# Patient Record
Sex: MALE | Race: WHITE
[De-identification: names, ages, dates, MRNs, and addresses within clinical notes are randomized per-mention and may not be internally consistent; named-entity substitution may affect disease eponyms.]

---

## 2023-09-08 ENCOUNTER — HOSPITAL ENCOUNTER (OUTPATIENT)
Dept: HOSPITAL 93 - PPH VACUNA | Age: 23
Discharge: HOME | End: 2023-09-08
Attending: PEDIATRICS
Payer: COMMERCIAL

## 2023-09-08 DIAGNOSIS — Z23: Primary | ICD-10-CM

## 2023-09-08 PROCEDURE — 90686 IIV4 VACC NO PRSV 0.5 ML IM: CPT

## 2023-09-23 ENCOUNTER — HOSPITAL ENCOUNTER (OUTPATIENT)
Dept: HOSPITAL 93 - RAD | Age: 23
Discharge: HOME | End: 2023-09-23
Attending: INTERNAL MEDICINE
Payer: COMMERCIAL

## 2023-09-23 DIAGNOSIS — J44.9: Primary | ICD-10-CM

## 2024-12-17 ENCOUNTER — HOSPITAL ENCOUNTER (EMERGENCY)
Dept: HOSPITAL 93 - ER | Age: 24
Discharge: HOME | End: 2024-12-17
Payer: OTHER GOVERNMENT

## 2024-12-17 VITALS — BODY MASS INDEX: 30.46 KG/M2 | HEIGHT: 68 IN | WEIGHT: 201 LBS

## 2024-12-17 DIAGNOSIS — L02.211: Primary | ICD-10-CM

## 2024-12-17 PROCEDURE — 99282 EMERGENCY DEPT VISIT SF MDM: CPT

## 2024-12-17 PROCEDURE — 96372 THER/PROPH/DIAG INJ SC/IM: CPT

## 2024-12-20 ENCOUNTER — HOSPITAL ENCOUNTER (INPATIENT)
Dept: HOSPITAL 93 - ER | Age: 24
LOS: 2 days | Discharge: HOME | DRG: 581 | End: 2024-12-22
Attending: INTERNAL MEDICINE | Admitting: INTERNAL MEDICINE
Payer: OTHER GOVERNMENT

## 2024-12-20 VITALS — WEIGHT: 201 LBS | BODY MASS INDEX: 30.46 KG/M2 | HEIGHT: 68 IN

## 2024-12-20 VITALS — OXYGEN SATURATION: 99 %

## 2024-12-20 VITALS — OXYGEN SATURATION: 97 %

## 2024-12-20 DIAGNOSIS — B95.61: ICD-10-CM

## 2024-12-20 DIAGNOSIS — L03.311: ICD-10-CM

## 2024-12-20 DIAGNOSIS — L02.211: Primary | ICD-10-CM

## 2024-12-20 LAB
ANION GAP SERPL CALC-SCNC: 16 MMOL/L (ref 10–20)
APTT PPP: 30.4 SECONDS (ref 22–34)
BACTERIA UR QL AUTO: 18.3 UL (ref 0–1933)
BASOPHILS NFR BLD AUTO: 0.6 % (ref 0–1.5)
BUN SERPL-MCNC: 14 MG/DL (ref 7–18)
BUN/CREAT SERPL: 16 (ref 7–25)
CALCIUM SERPL-MCNC: 9.2 MG/DL (ref 8.5–10.1)
CASTS # UR AUTO: 1.03 UL (ref 0–1.4)
CHLORIDE SERPL-SCNC: 105 MMOL/L (ref 98–107)
CO2 SERPL-SCNC: 29 MEQ/L (ref 21–32)
CREAT SERPL-MCNC: 0.89 MG/DL (ref 0.7–1.3)
EGFRCR SERPLBLD CKD-EPI 2021: 104.15 ML/MIN/{1.73_M2}
EOSINOPHIL NFR BLD AUTO: 4 % (ref 0–7)
EPI CELLS URNS QL MICRO: 9.3 UL (ref 0–38.8)
ERYTHROCYTE [DISTWIDTH] IN BLOOD BY AUTOMATED COUNT: 14 % (ref 11.5–14.5)
GLUCOSE SERPL-MCNC: 93 MG/DL (ref 65–100)
HCT VFR BLD AUTO: 43.3 % (ref 39–48)
HGB BLD-MCNC: 15 G/DL (ref 13–16)
INR PPP: 1.04
LYMPHOCYTES NFR BLD AUTO: 28.1 % (ref 12–44)
MCH RBC QN AUTO: 27.9 PG (ref 27–32)
MCHC RBC AUTO-ENTMCNC: 34.8 G/DL (ref 32–36)
MCV RBC AUTO: 80.3 FL (ref 80–100)
MONOCYTES NFR BLD AUTO: 6.8 % (ref 2–10)
NEUTROPHILS NFR BLD AUTO: 60.5 % (ref 47–76)
OSMOLALITY SERPL: 291 MOSM/KG (ref 275–295)
PH UR: 6 [PH] (ref 5–8)
PLATELET # BLD AUTO: 207 K/UL (ref 150–450)
PMV BLD AUTO: 9.1 FL (ref 7.2–11.1)
POTASSIUM SERPL-SCNC: 3.93 MEQ/L (ref 3.5–5.1)
PROTHROMBIN TIME: 11.3 SECONDS (ref 9–11.5)
RBC # BLD AUTO: 5.39 M/UL (ref 4–6)
RBC #/AREA URNS AUTO: 12.6 UL (ref 0–20.8)
SODIUM SERPL-SCNC: 146 MMOL/L (ref 136–145)
SP GR UR STRIP: 1.02 (ref 1–1.03)
UROBILINOGEN UR STRIP-MCNC: 0.2 E.U./DL
WBC # BLD AUTO: 6.8 K/UL (ref 4.5–11)
WBC URNS QL MICRO: 8.7 UL (ref 0–23.2)

## 2024-12-20 PROCEDURE — BW21ZZZ COMPUTERIZED TOMOGRAPHY (CT SCAN) OF ABDOMEN AND PELVIS: ICD-10-PCS | Performed by: INTERNAL MEDICINE

## 2024-12-20 NOTE — NUR
RAUL JUAREZ EDUCA A PTE SOBRE TX A RECIBIR EN EL AREA EL MISMO REFIERE ENTENDER.
CANALIZA A PTE Y SE COLOCA MEDICACION LIAT ORDEN MEDICA. SE REALIZA MUESTRA Y
SE KATIE PTE EN ESPERA DE RESULTADOS.

## 2024-12-20 NOTE — NUR
SE RECIBE PTE ALERTA Y ORIENTADO EL CUAL REFIERE VENIR POR SUPURACION EN AREA
DE ABDOMEN INFERIOR DEL LADO DERECHO. SE OBSERVA HERIDA CON SUPURACION. SE
MIDEN S/V A PTE Y SE UBICA.

## 2024-12-21 VITALS — OXYGEN SATURATION: 99 %

## 2024-12-21 VITALS — OXYGEN SATURATION: 95 %

## 2024-12-21 VITALS — OXYGEN SATURATION: 97 %

## 2024-12-21 LAB
ALBUMIN SERPL-MCNC: 3.5 GM/DL (ref 3.4–5)
CHOLEST SERPL-MCNC: 143 MG/DL (ref 0–200)
CHOLEST/HDLC SERPL: 2.6 {RATIO} (ref 0–5)
CRP SERPL-MCNC: 1.02 MG/DL (ref 0–0.29)
HDLC SERPL-MCNC: 56 MG/DL (ref 40–60)
LDLC SERPL CALC-MCNC: 77 MG/DL (ref 0–130)
TRIGL SERPL-MCNC: 50 MG/DL (ref 0–150)
TSH SERPL-ACNC: 1.15 UIU/ML (ref 0.36–3.74)
VLDLC SERPL CALC-MCNC: 10 MG/DL (ref 0–39)

## 2024-12-21 PROCEDURE — 0JD80ZZ EXTRACTION OF ABDOMEN SUBCUTANEOUS TISSUE AND FASCIA, OPEN APPROACH: ICD-10-PCS | Performed by: SURGERY

## 2024-12-22 VITALS — OXYGEN SATURATION: 96 %

## 2024-12-22 VITALS — OXYGEN SATURATION: 99 %
